# Patient Record
Sex: FEMALE | Race: BLACK OR AFRICAN AMERICAN | URBAN - METROPOLITAN AREA
[De-identification: names, ages, dates, MRNs, and addresses within clinical notes are randomized per-mention and may not be internally consistent; named-entity substitution may affect disease eponyms.]

---

## 2020-06-27 ENCOUNTER — OFFICE VISIT (OUTPATIENT)
Dept: PRIMARY CARE CLINIC | Age: 50
End: 2020-06-27

## 2020-06-30 LAB
SARS-COV-2: NOT DETECTED
SOURCE: NORMAL

## 2020-07-01 NOTE — RESULT ENCOUNTER NOTE
Please contact patient with their testing results: Your test for COVID-19, also known as novel coronavirus, came back negative. No virus was detected from the sample collected. Testing is not 100%. Until your symptoms are fully resolved, you may still be contagious. We recommend that you remain isolated for 7 days minimum or 72 hours after your symptoms have completely resolved, whichever is longer. If you were exposed to a known positive COIVID-19 patient, then you must remain isolated for 14 days. If you were tested for a pre-op, then you remain in isolated until your procedure. Continually monitor symptoms. Contact a medical provider if symptoms are worsening. If you have any additional questions, contact your PCP.     For additional information, please visit the Centers for Disease Control and Prevention [email or web address]